# Patient Record
Sex: FEMALE | Race: WHITE | NOT HISPANIC OR LATINO | Employment: STUDENT | URBAN - METROPOLITAN AREA
[De-identification: names, ages, dates, MRNs, and addresses within clinical notes are randomized per-mention and may not be internally consistent; named-entity substitution may affect disease eponyms.]

---
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
## 2024-05-28 ENCOUNTER — OFFICE VISIT (OUTPATIENT)
Dept: OBSTETRICS AND GYNECOLOGY | Facility: CLINIC | Age: 19
End: 2024-05-28
Attending: STUDENT IN AN ORGANIZED HEALTH CARE EDUCATION/TRAINING PROGRAM
Payer: COMMERCIAL

## 2024-05-28 VITALS
DIASTOLIC BLOOD PRESSURE: 68 MMHG | HEIGHT: 65 IN | BODY MASS INDEX: 21.33 KG/M2 | SYSTOLIC BLOOD PRESSURE: 104 MMHG | WEIGHT: 128 LBS

## 2024-05-28 DIAGNOSIS — Z01.419 WELL WOMAN EXAM: Primary | ICD-10-CM

## 2024-05-28 PROCEDURE — 99999 PR PBB SHADOW E&M-EST. PATIENT-LVL II: CPT | Mod: PBBFAC,,, | Performed by: STUDENT IN AN ORGANIZED HEALTH CARE EDUCATION/TRAINING PROGRAM

## 2024-05-28 PROCEDURE — 1159F MED LIST DOCD IN RCRD: CPT | Mod: CPTII,S$GLB,, | Performed by: STUDENT IN AN ORGANIZED HEALTH CARE EDUCATION/TRAINING PROGRAM

## 2024-05-28 PROCEDURE — 99459 PELVIC EXAMINATION: CPT | Mod: S$GLB,,, | Performed by: STUDENT IN AN ORGANIZED HEALTH CARE EDUCATION/TRAINING PROGRAM

## 2024-05-28 PROCEDURE — 3074F SYST BP LT 130 MM HG: CPT | Mod: CPTII,S$GLB,, | Performed by: STUDENT IN AN ORGANIZED HEALTH CARE EDUCATION/TRAINING PROGRAM

## 2024-05-28 PROCEDURE — 99395 PREV VISIT EST AGE 18-39: CPT | Mod: S$GLB,,, | Performed by: STUDENT IN AN ORGANIZED HEALTH CARE EDUCATION/TRAINING PROGRAM

## 2024-05-28 PROCEDURE — 3078F DIAST BP <80 MM HG: CPT | Mod: CPTII,S$GLB,, | Performed by: STUDENT IN AN ORGANIZED HEALTH CARE EDUCATION/TRAINING PROGRAM

## 2024-05-28 PROCEDURE — 3008F BODY MASS INDEX DOCD: CPT | Mod: CPTII,S$GLB,, | Performed by: STUDENT IN AN ORGANIZED HEALTH CARE EDUCATION/TRAINING PROGRAM

## 2024-05-28 RX ORDER — ISOTRETINOIN 10 MG/1
10 CAPSULE ORAL
COMMUNITY

## 2024-05-28 NOTE — PROGRESS NOTES
Chief Complaint: Well Woman Exam     HPI:      Lorna Clark is a 18 y.o.  who presents today for well woman exam.  LMP: No LMP recorded. Patient has had an implant.  Kyleena iud.  No issues, problems, or complaints. Specifically, patient denies abnormal vaginal bleeding, discharge, pelvic pain, urinary problems, or changes in appetite.   OB History          0    Para   0    Term   0       0    AB   0    Living   0         SAB   0    IAB   0    Ectopic   0    Multiple   0    Live Births   0           Obstetric Comments   Age at menarche 9             Past Medical History:   Diagnosis Date    Acid reflux     Anxiety      Past Surgical History:   Procedure Laterality Date    poly up      TONSILLECTOMY, ADENOIDECTOMY       Social History     Socioeconomic History    Marital status: Single   Tobacco Use    Smoking status: Never    Smokeless tobacco: Never   Substance and Sexual Activity    Alcohol use: Never    Drug use: Never    Sexual activity: Not Currently     Social Determinants of Health     Financial Resource Strain: Low Risk  (2022)    Received from [facility]    Overall Financial Resource Strain (CARDIA)     Difficulty of Paying Living Expenses: Not very hard   Food Insecurity: No Food Insecurity (2022)    Received from [facility]    Hunger Vital Sign     Worried About Running Out of Food in the Last Year: Never true     Ran Out of Food in the Last Year: Never true   Transportation Needs: No Transportation Needs (2022)    Received from [facility]    PRAPARE - Transportation     Lack of Transportation (Medical): No     Lack of Transportation (Non-Medical): No   Physical Activity: Sufficiently Active (2022)    Received from [facility]    Exercise Vital Sign     Days of Exercise per Week: 7 days     Minutes of Exercise per Session: 40 min   Stress: No Stress Concern Present (2022)    Received from [facility]    [employer] -  "Occupational Stress Questionnaire     Feeling of Stress : Only a little   Housing Stability: Low Risk  (4/19/2022)    Housing Stability Vital Sign     Unable to Pay for Housing in the Last Year: No     Number of Places Lived in the Last Year: 1     Unstable Housing in the Last Year: No     Family History   Problem Relation Name Age of Onset    Colon cancer Paternal Grandmother         Current Outpatient Medications:     ISOtretinoin (ACCUTANE) 10 MG capsule, Take 10 mg by mouth. (Patient not taking: Reported on 5/28/2024), Disp: , Rfl:     Current Facility-Administered Medications:     levonorgestreL (KYLEENA) 17.5 mcg/24 hrs (5 yrs) 19.5 mg IUD 17.5 mcg, 17.5 mcg, Intrauterine, , Meaghan Thayer MD, 17.5 mcg at 05/31/22 1400    ROS:     GENERAL: Denies unintentional weight gain or weight loss. Feeling well overall.   SKIN: Denies rash or lesions.   HEENT: Denies headaches, or vision changes.   CARDIOVASCULAR: Denies palpitations or chest pain.   RESPIRATORY: Denies shortness of breath or dyspnea on exertion.  BREASTS: Denies pain, lumps, or nipple discharge.   ABDOMEN: Denies abdominal pain, constipation, diarrhea, nausea, vomiting, change in appetite.  URINARY: Denies frequency, dysuria, hematuria.  NEUROLOGIC: Denies syncope or weakness.   PSYCHIATRIC: Denies depression, anxiety or mood swings.    Physical Exam:      PHYSICAL EXAM:  /68   Ht 5' 5" (1.651 m)   Wt 58 kg (127 lb 15.6 oz)   BMI 21.30 kg/m²   Body mass index is 21.3 kg/m².     APPEARANCE: Well nourished, well developed, in no acute distress.  PSYCH: Appropriate mood and affect.  SKIN: No acne or hirsutism.  NECK: Neck symmetric without masses or thyromegaly.  NODES: No inguinal, axillary, or supraclavicular lymph node enlargement.  BREASTS: Symmetrical, no skin changes or visible lesions.  No palpable masses or nipple discharge bilaterally.  ABDOMEN: Soft.  No tenderness or masses.    PELVIC: Normal external genitalia without lesions.  " Normal hair distribution.  Adequate perineal body, normal urethral meatus.  Vagina moist and well rugated without lesions or discharge.  Cervix pink, without lesions, discharge or tenderness.  +IUD strings.  No significant cystocele or rectocele.  Bimanual exam shows uterus to be normal size, regular, mobile and nontender.  Adnexa without masses or tenderness.    EXTREMITIES: No edema.  No tenderness to palpation.  Female chaperone was present for exam.     Assessment/Plan:     18 y.o.     Well woman exam          Counselin.  Annual exam performed today without difficulty.  Patient was counseled today on current ASCCP pap guidelines, the recommendation for yearly pelvic exams, healthy diet and exercise routines, breast self awareness.  Pap smear to start at 18 yo.  All questions answered.    2.  Contraception:  iud.  3.  STD testing:  declines.  4.  Follow up with PCP for other health maintenance.  5.  Follow up in about 1 year (around 2025).      Use of the [facility] Patient Portal discussed and encouraged during today's visit.

## 2024-06-19 ENCOUNTER — PATIENT MESSAGE (OUTPATIENT)
Dept: NEUROLOGY | Facility: CLINIC | Age: 19
End: 2024-06-19
Payer: COMMERCIAL

## 2024-07-25 PROBLEM — G90.A POTS (POSTURAL ORTHOSTATIC TACHYCARDIA SYNDROME): Status: ACTIVE | Noted: 2024-07-25

## 2025-03-31 ENCOUNTER — TELEPHONE (OUTPATIENT)
Dept: OBSTETRICS AND GYNECOLOGY | Facility: CLINIC | Age: 20
End: 2025-03-31
Payer: COMMERCIAL

## 2025-04-01 NOTE — TELEPHONE ENCOUNTER
Basilio Wilson pt calling, states she having severe bloating and some abdominal pain would like to discuss     4/1/25 @ 0827 Returned pt's call no answer left message. Will also send portal message. Pt reports bloating, pain, Pt states she is experiencing more cramping and bloating. Pt leaves to go back to college Monday. Scheduled to see Dr Thayer Thursday at 1:00 at [facility]. Pt kevin.

## 2025-04-03 ENCOUNTER — OFFICE VISIT (OUTPATIENT)
Dept: OBSTETRICS AND GYNECOLOGY | Facility: CLINIC | Age: 20
End: 2025-04-03
Attending: STUDENT IN AN ORGANIZED HEALTH CARE EDUCATION/TRAINING PROGRAM
Payer: COMMERCIAL

## 2025-04-03 VITALS
DIASTOLIC BLOOD PRESSURE: 60 MMHG | SYSTOLIC BLOOD PRESSURE: 102 MMHG | HEIGHT: 65 IN | BODY MASS INDEX: 21.98 KG/M2 | WEIGHT: 131.94 LBS

## 2025-04-03 DIAGNOSIS — R14.0 BLOATING: ICD-10-CM

## 2025-04-03 DIAGNOSIS — N94.10 DYSPAREUNIA IN FEMALE: ICD-10-CM

## 2025-04-03 DIAGNOSIS — R10.9 ABDOMINAL PAIN, UNSPECIFIED ABDOMINAL LOCATION: Primary | ICD-10-CM

## 2025-04-03 PROCEDURE — 3074F SYST BP LT 130 MM HG: CPT | Mod: CPTII,S$GLB,, | Performed by: STUDENT IN AN ORGANIZED HEALTH CARE EDUCATION/TRAINING PROGRAM

## 2025-04-03 PROCEDURE — 3078F DIAST BP <80 MM HG: CPT | Mod: CPTII,S$GLB,, | Performed by: STUDENT IN AN ORGANIZED HEALTH CARE EDUCATION/TRAINING PROGRAM

## 2025-04-03 PROCEDURE — 99213 OFFICE O/P EST LOW 20 MIN: CPT | Mod: S$GLB,,, | Performed by: STUDENT IN AN ORGANIZED HEALTH CARE EDUCATION/TRAINING PROGRAM

## 2025-04-03 PROCEDURE — 99999 PR PBB SHADOW E&M-EST. PATIENT-LVL III: CPT | Mod: PBBFAC,,, | Performed by: STUDENT IN AN ORGANIZED HEALTH CARE EDUCATION/TRAINING PROGRAM

## 2025-04-03 PROCEDURE — 1159F MED LIST DOCD IN RCRD: CPT | Mod: CPTII,S$GLB,, | Performed by: STUDENT IN AN ORGANIZED HEALTH CARE EDUCATION/TRAINING PROGRAM

## 2025-04-03 PROCEDURE — 3008F BODY MASS INDEX DOCD: CPT | Mod: CPTII,S$GLB,, | Performed by: STUDENT IN AN ORGANIZED HEALTH CARE EDUCATION/TRAINING PROGRAM

## 2025-04-03 NOTE — PROGRESS NOTES
"Chief Complaint: Bloating, dyspareunia, PMS symptoms     HPI:      Lorna Clark is a 19 y.o.  who presents today for for evaluation of several concerns.  Has noticed bloating for the past year.  Feels bloated mostly all the time - some times worsens.  Not associated with cycle.    Also some pain with intercourse.  No changes in sx partner, Happens every time she has sex.  Worse with deep penetration.   Some dysmenorrhea as well.  No other issues or concerns.    LMP: Patient's last menstrual period was 2025.    OB History          0    Para   0    Term   0       0    AB   0    Living   0         SAB   0    IAB   0    Ectopic   0    Multiple   0    Live Births   0           Obstetric Comments   Age at menarche 9             Past Medical History:   Diagnosis Date    Acid reflux     Anxiety      Past Surgical History:   Procedure Laterality Date    TONSILLECTOMY, ADENOIDECTOMY       Social History[1]  Family History   Problem Relation Name Age of Onset    Colon cancer Paternal Grandmother       Current Medications[2]    ROS:     GENERAL: Denies unintentional weight gain or weight loss. Feeling well overall.   SKIN: Denies rash or lesions.   HEENT: Denies headaches, or vision changes.   CARDIOVASCULAR: Denies palpitations or chest pain.   RESPIRATORY: Denies shortness of breath or dyspnea on exertion.  BREASTS: Denies pain, lumps, or nipple discharge.   ABDOMEN: Denies abdominal pain, constipation, diarrhea, nausea, vomiting, change in appetite.  URINARY: Denies frequency, dysuria, hematuria.  NEUROLOGIC: Denies syncope or weakness.   PSYCHIATRIC: Denies depression, anxiety or mood swings.    Physical Exam:      PHYSICAL EXAM:  /60   Ht 5' 5" (1.651 m)   Wt 59.8 kg (131 lb 15.1 oz)   LMP 2025   BMI 21.96 kg/m²   Body mass index is 21.96 kg/m².     APPEARANCE: Well nourished, well developed, in no acute distress.  PSYCH: Appropriate mood and affect.  SKIN: No acne or " hirsutism.  ABDOMEN: Soft.  No tenderness or masses.    PELVIC: Normal external genitalia without lesions.  Normal hair distribution.  Adequate perineal body, normal urethral meatus.  Vagina moist and well rugated without lesions or discharge.  Cervix pink, without lesions, discharge or tenderness.  +IUD strings.  No significant cystocele or rectocele.  Bimanual exam shows uterus to be normal size, regular, mobile and nontender.  Adnexa without masses or tenderness.    EXTREMITIES: No edema.  No tenderness to palpation.  Female chaperone was present for exam.     Assessment/Plan:     19 y.o.     Abdominal pain, unspecified abdominal location  -     US Pelvis Comp with Transvag NON-OB (xpd; Future; Expected date: 2025    Bloating    Dyspareunia in female          Counselin.  Reviewed multiple etiologies of abdominal pain.  Discussed U/S for further eval of possible gyn etiology.  Also reviewed GI and pelvic floor PT if needed.  Also discussed trial of ocp, patch, ring to see if this helps with pms symptoms.  R/b/a reviewed and all questions answered.  Declines but wants to go ahead with u/s and will also check with GI.  All ques answered.  RTC for u/s or sooner if needed         [1]   Social History  Socioeconomic History    Marital status: Single    Number of children: 0   Tobacco Use    Smoking status: Never    Smokeless tobacco: Never   Substance and Sexual Activity    Alcohol use: Never    Drug use: Never    Sexual activity: Not Currently     Social Drivers of Health     Financial Resource Strain: Low Risk  (2022)    Received from [facility]    Overall Financial Resource Strain (CARDIA)     Difficulty of Paying Living Expenses: Not very hard   Food Insecurity: No Food Insecurity (2022)    Received from [facility]    Hunger Vital Sign     Worried About Running Out of Food in the Last Year: Never true     Ran Out of Food in the Last Year: Never true   Transportation Needs: No  Transportation Needs (12/8/2022)    Received from [facility]    PRAPARE - Transportation     Lack of Transportation (Medical): No     Lack of Transportation (Non-Medical): No   Physical Activity: Sufficiently Active (12/8/2022)    Received from [facility]    Exercise Vital Sign     Days of Exercise per Week: 7 days     Minutes of Exercise per Session: 40 min   Stress: No Stress Concern Present (12/8/2022)    Received from [facility]    [employer] - Occupational Stress Questionnaire     Feeling of Stress : Only a little   Housing Stability: Low Risk  (4/19/2022)    Housing Stability Vital Sign     Unable to Pay for Housing in the Last Year: No     Number of Places Lived in the Last Year: 1     Unstable Housing in the Last Year: No   [2]   Current Outpatient Medications:     ISOtretinoin (ACCUTANE) 10 MG capsule, Take 10 mg by mouth once daily., Disp: , Rfl:     LINZESS 290 mcg Cap capsule, Take 290 mcg by mouth before breakfast., Disp: , Rfl:     midodrine (PROAMATINE) 5 MG Tab, Take 5 mg by mouth 3 (three) times daily., Disp: , Rfl:     propranoloL (INDERAL) 10 MG tablet, Take 10 mg by mouth 2 (two) times daily., Disp: , Rfl:     Current Facility-Administered Medications:     levonorgestreL (KYLEENA) 17.5 mcg/24 hrs (5 yrs) 19.5 mg IUD 17.5 mcg, 17.5 mcg, Intrauterine, , Meaghan Thayer MD, 17.5 mcg at 05/31/22 1400

## 2025-07-03 ENCOUNTER — TELEPHONE (OUTPATIENT)
Dept: GASTROENTEROLOGY | Facility: CLINIC | Age: 20
End: 2025-07-03
Payer: COMMERCIAL

## 2025-07-03 NOTE — TELEPHONE ENCOUNTER
Copied from CRM #[id number]. Topic: Appointments - Appointment Scheduling  >> Jul 2, 2025  4:01 PM Courtney wrote:  .Type:  Needs Medical Advice    Who Called: pt   Symptoms (please be specific): asking for appt due to abdominal pain. Pt has POTS. Was referred to see this provider by cardiologist . Attempted to schedule, no access to private schedule    Would the patient rather a call back or a response via [facility]? Call   Best Call Back Number: [phone]  Additional Information: Dr Elliott in [place] , referred

## 2025-07-07 ENCOUNTER — PATIENT MESSAGE (OUTPATIENT)
Dept: GASTROENTEROLOGY | Facility: CLINIC | Age: 20
End: 2025-07-07
Payer: COMMERCIAL

## 2025-07-07 ENCOUNTER — TELEPHONE (OUTPATIENT)
Dept: GASTROENTEROLOGY | Facility: CLINIC | Age: 20
End: 2025-07-07
Payer: COMMERCIAL

## 2025-07-22 NOTE — PROGRESS NOTES
[facility] Consultation Note        EDS/POTS/MCAS    Reason for Consult:  The primary encounter diagnosis was Irritable bowel syndrome with diarrhea. Diagnoses of POTS (postural orthostatic tachycardia syndrome) and Lynn-Danlos syndrome were also pertinent to this visit.    PCP:   Kyle Castle       Referring MD:      Treatment Team:  EDS: Dr. Ferrell  Allergy: Dr Darden  CV: Dr Elliott      Initial History of Present Illness (HPI):  This is a 19 y.o. female here for evaluation of possible Mast Cell Activation    History of Present Illness    CHIEF COMPLAINT:  Patient presents today for evaluation of GI symptoms including bloating.    GASTROINTESTINAL HISTORY:  She has a history of GI workup with Dr. Keita. Colonoscopy identified a pre-cancerous polyp requiring follow up in 1-2 years. EGD showed gastritis. SIBO was diagnosed via breath test and treated with Xifaxan, providing temporary relief for one month before symptom recurrence. She currently experiences chronic constipation managed with Miralax, suppositories, and occasional gentle laxatives. She reports intermittent, self-resolving abdominal pain and persistent bloating after initial SIBO treatment. She notes decreased gut motility potentially contributing to her symptoms.    FAMILY HISTORY:  Her grandmother was diagnosed with colon cancer. Her sister, who is approximately 18 months younger, also has polyps. She tested negative for Rose Syndrome. The family is currently undergoing genetic testing due to early onset of polyps in both her and her sister.    MEDICAL HISTORY:  She has POTS and Dysautonomia with positional symptoms including lightheadedness when tilting head or straining. She reports frequent urination and urinary incontinence, particularly when laughing or coughing. She describes occasional black spots and metallic star-like visual phenomena, typically occurring in one eye. She is under care of Dr. Elliott for  dysautonomia and seeing an endocrinologist.    LABORATORY RESULTS:  Plasma Histamine was slightly elevated at 1.9. Parietal Cell Antibody was positive, suggesting potential autoimmune component. Vitamin D was low. Tryptase was elevated at 7.9, though still within normal limits.    CURRENT MEDICATIONS:  She takes Zyrtec and multiple vitamins recommended by a fashion clinic, though not consistently.      ROS:  General: -fever, -chills, -fatigue, -weight gain, -weight loss  Eyes: -vision changes, -redness, -discharge, +spots, specks or flashing lights, +blind spots  ENT: -ear pain, -nasal congestion, -sore throat  Cardiovascular: -chest pain, -palpitations, +lower extremity edema  Respiratory: -cough, -shortness of breath  Gastrointestinal: +abdominal pain, -nausea, -vomiting, -diarrhea, +constipation, -blood in stool, +bloating  Genitourinary: -dysuria, -hematuria, +frequency, +urinary incontinence  Musculoskeletal: +joint pain, -muscle pain  Skin: +rash, -lesion, +lumps/masses, +skin redness  Neurological: -headache, -dizziness, -numbness, -tingling, +syncope, +memory problems, +lightheadedness  Psychiatric: -anxiety, -depression, -sleep difficulty           ROS:    Liver (  ) increased enzymes  CNS ( X ) migraines, brain fog, anxiety  CVS (  X) tachycardia, POTS   ( X) Interstital cystitis  Ocular (  ) Conjunctivitis  Skin ( X) flushing, rash, dermatographia  MSK ( X) joint hyperflexibility/ Lynn Danlos Syndrome      Medical History:  has a past medical history of Acid reflux and Anxiety.    Surgical History:  has a past surgical history that includes TONSILLECTOMY, ADENOIDECTOMY (2010).    Family History: family history includes Colon cancer in her paternal grandmother; Colon polyps in her sister..     Social History:  reports that she has never smoked. She has never used smokeless tobacco. She reports that she does not drink alcohol and does not use drugs.    Review of patient's allergies indicates:   Allergen  "Reactions    Zonisamide     Lamotrigine Rash       Medication List with Changes/Refills   New Medications    NEOMYCIN (MYCIFRADIN) 500 MG TAB    Take 1 tablet (500 mg total) by mouth 2 (two) times daily. for 14 days    RIFAXIMIN (XIFAXAN) 550 MG TAB    Take 1 tablet (550 mg total) by mouth 3 (three) times daily. for 14 days   Current Medications    ISOTRETINOIN (ACCUTANE) 10 MG CAPSULE    Take 10 mg by mouth once daily.    LINZESS 290 MCG CAP CAPSULE    Take 290 mcg by mouth before breakfast.    MIDODRINE (PROAMATINE) 5 MG TAB    Take 5 mg by mouth 3 (three) times daily.    PROPRANOLOL (INDERAL) 10 MG TABLET    Take 10 mg by mouth 2 (two) times daily.         Objective Findings:    Vital Signs:  /70 (Patient Position: Sitting)   Pulse 69   LMP 07/09/2025   There is no height or weight on file to calculate BMI.    Physical Exam:  General Appearance: Well appearing in no acute distress  Head:   Normocephalic, without obvious abnormality  Eyes:    No scleral icterus, EOMI  ENT: Neck supple, Lips, mucosa, and tongue normal; teeth and gums normal  Lungs: CTA bilaterally in anterior and posterior fields, no wheezes, no crackles.  Heart:  Regular rate and rhythm, S1, S2 normal, no murmurs heard  Abdomen: Soft, non tender, non distended with positive bowel sounds in all four quadrants. No hepatosplenomegaly, ascites, or mass  Extremities: 2+ pulses, no clubbing, cyanosis or edema  Skin: No rash  Neurologic: CN II-XII intact      Labs:  Lab Results   Component Value Date    WBC 6.51 04/19/2022    HGB 12.4 04/19/2022    HCT 37.7 04/19/2022     04/19/2022    ALT 23 12/13/2022    AST 13 12/13/2022     12/13/2022    K 3.9 12/13/2022     04/19/2022    CREATININE 0.66 12/13/2022    BUN 13.6 12/13/2022    CO2 26 12/13/2022    TSH 1.369 02/02/2022    INR 1.2 12/13/2022       No results found for: "HPYLORINTERP"  No results found for: "HPYLORIANTIG"        Imaging:    Endoscopy:      EGD/Colonoscopy 4/24 - " serrated polyp in cecum    Assessment & Plan    G90.A Postural orthostatic tachycardia syndrome [POTS]  K63.8219 Small intestinal bacterial overgrowth, unspecified  K59.04 Chronic idiopathic constipation  K29.70 Gastritis, unspecified, without bleeding  N39.3 Stress incontinence (female) (male)  T78.40XA Allergy, unspecified, initial encounter  E55.9 Vitamin D deficiency, unspecified  K90.89 Other intestinal malabsorption  Z86.0109 Personal history of other colon polyps  Z80.0 Family history of malignant neoplasm of digestive organs  Z83.719 Family history of colon polyps, unspecified  Z71.9 Counseling, unspecified  1. Irritable bowel syndrome with diarrhea    2. POTS (postural orthostatic tachycardia syndrome)    3. Lynn-Danlos syndrome      POSTURAL ORTHOSTATIC TACHYCARDIA SYNDROME (POTS) AND DYSAUTONOMIA:  - POTS, dysautonomia, and EDS complicate management of GI symptoms.  - Discussed how dysautonomia can affect digestion and motility.    SMALL INTESTINAL BACTERIAL OVERGROWTH (SIBO):  - Previous SIBO treatment with Xifaxan provided only temporary relief.  - Elevated methane levels on breath test suggest need for combination antibiotic therapy.  - Started combination antibiotic therapy for SIBO treatment (specific medications to be sent to [facility]).    GASTRITIS AND DIGESTIVE ISSUES:  - Positive parietal cell antibody indicates autoimmune component, potentially affecting stomach acid production and digestion.  - Explained the connection between parietal cell antibodies, reduced stomach acid production, and bloating.  - Clarified that bloating, while uncomfortable, likely does not cause organ damage.  - Started digestive enzymes with meals, particularly with lunch and dinner when bloating is noticed.    ALLERGY AND MAST CELL ISSUES:  - Considered low-dose naltrexone for potential mast cell issues, awaiting consultation with Dr. Darden's and her medication recommendations.    FOLLOW-UP PLAN:  -  Follow up in a few months after completing antibiotic treatment to reassess symptoms and treatment efficacy.  - Follow up in April of the coming year for next colonoscopy, to be performed by this office.         Follow up in about 3 months (around 10/23/2025).      Order summary:  Orders Placed This Encounter    rifAXIMin (XIFAXAN) 550 mg Tab    neomycin (MYCIFRADIN) 500 mg Tab         Thank you so much for allowing me to participate in the care of Lorna Keane MD      This note was generated with the assistance of ambient listening technology. Verbal consent was obtained by the patient and accompanying visitor(s) for the recording of patient appointment to facilitate this note. I attest to having reviewed and edited the generated note for accuracy, though some syntax or spelling errors may persist. Please contact the author of this note for any clarification.    I have reviewed a vast amount of outside data and records prior to the patient visit. The medical decision making was complex as this was a second opinion.  I spent  30 minutes with the patient, 20 minutes reviewing records ahead of time.

## 2025-07-23 ENCOUNTER — OFFICE VISIT (OUTPATIENT)
Facility: CLINIC | Age: 20
End: 2025-07-23
Payer: COMMERCIAL

## 2025-07-23 VITALS
DIASTOLIC BLOOD PRESSURE: 70 MMHG | HEART RATE: 69 BPM | SYSTOLIC BLOOD PRESSURE: 104 MMHG | WEIGHT: 130.94 LBS | HEIGHT: 64 IN | BODY MASS INDEX: 22.35 KG/M2

## 2025-07-23 DIAGNOSIS — Q79.60 EHLERS-DANLOS SYNDROME: ICD-10-CM

## 2025-07-23 DIAGNOSIS — G90.A POTS (POSTURAL ORTHOSTATIC TACHYCARDIA SYNDROME): ICD-10-CM

## 2025-07-23 DIAGNOSIS — K58.0 IRRITABLE BOWEL SYNDROME WITH DIARRHEA: Primary | ICD-10-CM

## 2025-07-23 PROCEDURE — 1159F MED LIST DOCD IN RCRD: CPT | Mod: CPTII,S$GLB,, | Performed by: INTERNAL MEDICINE

## 2025-07-23 PROCEDURE — 99205 OFFICE O/P NEW HI 60 MIN: CPT | Mod: S$GLB,,, | Performed by: INTERNAL MEDICINE

## 2025-07-23 PROCEDURE — 3074F SYST BP LT 130 MM HG: CPT | Mod: CPTII,S$GLB,, | Performed by: INTERNAL MEDICINE

## 2025-07-23 PROCEDURE — 3078F DIAST BP <80 MM HG: CPT | Mod: CPTII,S$GLB,, | Performed by: INTERNAL MEDICINE

## 2025-07-23 PROCEDURE — 3008F BODY MASS INDEX DOCD: CPT | Mod: CPTII,S$GLB,, | Performed by: INTERNAL MEDICINE

## 2025-07-23 PROCEDURE — 99999 PR PBB SHADOW E&M-EST. PATIENT-LVL IV: CPT | Mod: PBBFAC,,, | Performed by: INTERNAL MEDICINE

## 2025-07-23 RX ORDER — NEOMYCIN SULFATE 500 MG/1
500 TABLET ORAL 2 TIMES DAILY
Qty: 28 TABLET | Refills: 0 | Status: SHIPPED | OUTPATIENT
Start: 2025-07-23 | End: 2025-08-06

## 2025-07-23 NOTE — PATIENT INSTRUCTIONS
Supplement options:     2. Enzymes with Betaine HCL (for those with low stomach acid)   ? Bio-Gest by [employer]   ? Digestive Enzymes Ultra with Betaine by [employer]

## 2025-08-05 RX ORDER — LINACLOTIDE 290 UG/1
290 CAPSULE, GELATIN COATED ORAL
Qty: 30 CAPSULE | Refills: 3 | Status: SHIPPED | OUTPATIENT
Start: 2025-08-05